# Patient Record
Sex: FEMALE | Race: WHITE | NOT HISPANIC OR LATINO | Employment: FULL TIME | URBAN - NONMETROPOLITAN AREA
[De-identification: names, ages, dates, MRNs, and addresses within clinical notes are randomized per-mention and may not be internally consistent; named-entity substitution may affect disease eponyms.]

---

## 2024-09-21 ENCOUNTER — APPOINTMENT (OUTPATIENT)
Dept: RADIOLOGY | Facility: CLINIC | Age: 54
End: 2024-09-21
Payer: COMMERCIAL

## 2024-09-21 ENCOUNTER — TELEPHONE (OUTPATIENT)
Dept: URGENT CARE | Facility: CLINIC | Age: 54
End: 2024-09-21

## 2024-09-21 ENCOUNTER — OFFICE VISIT (OUTPATIENT)
Dept: URGENT CARE | Facility: CLINIC | Age: 54
End: 2024-09-21
Payer: COMMERCIAL

## 2024-09-21 VITALS
SYSTOLIC BLOOD PRESSURE: 140 MMHG | RESPIRATION RATE: 16 BRPM | WEIGHT: 194.8 LBS | HEIGHT: 67 IN | TEMPERATURE: 97.6 F | DIASTOLIC BLOOD PRESSURE: 92 MMHG | BODY MASS INDEX: 30.57 KG/M2 | OXYGEN SATURATION: 99 % | HEART RATE: 81 BPM

## 2024-09-21 DIAGNOSIS — M54.50 ACUTE MIDLINE LOW BACK PAIN WITHOUT SCIATICA: Primary | ICD-10-CM

## 2024-09-21 DIAGNOSIS — M54.50 ACUTE MIDLINE LOW BACK PAIN WITHOUT SCIATICA: ICD-10-CM

## 2024-09-21 PROCEDURE — 72110 X-RAY EXAM L-2 SPINE 4/>VWS: CPT

## 2024-09-21 PROCEDURE — S9083 URGENT CARE CENTER GLOBAL: HCPCS

## 2024-09-21 PROCEDURE — G0382 LEV 3 HOSP TYPE B ED VISIT: HCPCS

## 2024-09-21 PROCEDURE — 96372 THER/PROPH/DIAG INJ SC/IM: CPT

## 2024-09-21 RX ORDER — KETOROLAC TROMETHAMINE 30 MG/ML
30 INJECTION, SOLUTION INTRAMUSCULAR; INTRAVENOUS ONCE
Status: COMPLETED | OUTPATIENT
Start: 2024-09-21 | End: 2024-09-21

## 2024-09-21 RX ORDER — CYCLOBENZAPRINE HCL 5 MG
5 TABLET ORAL 3 TIMES DAILY PRN
Qty: 12 TABLET | Refills: 0 | Status: SHIPPED | OUTPATIENT
Start: 2024-09-21

## 2024-09-21 RX ORDER — METHYLPREDNISOLONE 4 MG
TABLET, DOSE PACK ORAL
Qty: 21 EACH | Refills: 0 | Status: SHIPPED | OUTPATIENT
Start: 2024-09-21

## 2024-09-21 RX ADMIN — KETOROLAC TROMETHAMINE 30 MG: 30 INJECTION, SOLUTION INTRAMUSCULAR; INTRAVENOUS at 11:02

## 2024-09-21 NOTE — TELEPHONE ENCOUNTER
Attempted to contact patient regarding final XR read. Awaiting call back.    the EKG is unchanged from prior EKG

## 2024-09-21 NOTE — TELEPHONE ENCOUNTER
Patient returned phone call and aware of final XR results: Minimal disc degeneration at L3-L4 and L4-L5 and degenerative arthritis of the L4-L5 facet joints.  Linear lucency projecting over the soft tissues of the upper buttocks region on the lateral view is indeterminate. Possibly just artifact. Soft tissue gas not excluded. Patient without external signs of swelling/skin changes or penetrating injury. No flu-like symptoms. Neuro exam intact. Recommend close follow up with PCP or proceed to ED if symptoms worsen despite medications and PT. Patient verbalized understanding of instructions given and no questions or concerns.

## 2024-09-21 NOTE — PROGRESS NOTES
[facility]        NAME: Marga Neil is a 54 y.o. female  : 1970    MRN: [id number]  DATE: 2024  TIME: 12:47 PM    Assessment and Plan   Acute midline low back pain without sciatica [M54.50]  1. Acute midline low back pain without sciatica  XR spine lumbar minimum 4 views non injury    ketorolac (TORADOL) injection 30 mg    methylPREDNISolone 4 MG tablet therapy pack    cyclobenzaprine (FLEXERIL) 5 mg tablet    Ambulatory Referral to Physical Therapy        Neuro exam intact, no red flag findings. Preliminary XR read negative for acute osseous abnormality, final read pending. Toradol IM. Muscle relaxer and oral steroids, caution advised. Encouraged continued supportive measures.  Heat/Ice. Topical analgesics. Referral placed to PT. Follow up with PCP in 3-5 days or proceed to emergency department for worsening symptoms.  Patient and mother verbalized understanding of instructions given.       Patient Instructions     Patient Instructions   Preliminary XR read negative, final read pending  No NSAIDs for the next 6 hours then OTC Tylenol/Ibuprofen for pain  Take muscle relaxer as prescribed but do not drink alcohol, drive your vehicle, or operate heavy machinery  Take oral steroids as prescribed  Heat/Ice  Topical analgesics  Referral placed to PT   Follow up with PCP in 3-5 days.  Proceed to  ER if symptoms worsen.    If tests have been performed at [facility], our office will contact you with results if changes need to be made to the care plan discussed with you at the visit.  [facility]      Patient Education     Low back pain - Discharge instructions   The Basics   Written by the doctors and editors at [facility]   What are discharge instructions? -- Discharge instructions are information about how to take care of yourself after getting medical care for a health problem.  What is low back pain? -- Low back pain is pain or discomfort in  "the lower part of your back. Many people have low back pain at some point, and it most often gets better on its own. Many different things can cause low back pain. Most of the time, doctors do not know the exact cause.  Back pain can happen if you strain a muscle. This is often what has happened when a person \"throws out\" their back. This refers to pain that starts suddenly after physical activity, like lifting something heavy or bending the back.  Back pain can also happen if you have (figure 1):   Damaged, bulging, or torn discs   Arthritis affecting the joints of the spine   Bony growths on the vertebrae that crowd nearby nerves   A \"compression fracture\" due to osteoporosis (a condition that makes your bones weak)   A vertebra out of place   Narrowing in the spinal canal   A tumor or infection (but this is very rare)  How do I care for myself at home? -- Ask the doctor or nurse what you should do when you go home. Make sure that you understand exactly what you need to do to care for yourself. Ask questions if there is anything you do not understand.  You should also:   Use heat on your back for short periods of time, if it helps with pain. Put a heating pad (on the low setting) on your back for 20 minutes at a time a few times each day. Never go to sleep with heat on your back.   Try to stay as active as you can without causing too much pain, if your doctor or nurse said that it is OK. If your pain is severe, you might need to rest for a day or 2. But it's important to get back to walking and moving as soon as possible. Try to keep doing your normal daily activities. Get up and move around gently during the day as you are able.   Slowly start to increase your activity level as you are able to. If something causes your pain to come back or get worse, stop and go back to doing easier activities that did not hurt.   Avoid sitting or standing in 1 position for a long time. You might want to sleep with a pillow under " "or between your knees if this eases your pain.   Take a medicine like ibuprofen (sample brand names: Advil, Motrin) or naproxen (brand name: Aleve) for pain, if needed. These are nonsteroidal antiinflammatory drugs (\"NSAIDs\"). They might work better than acetaminophen for low back pain.   Talk to your doctor or nurse before trying any of the following. These treatments might help you feel better for a little while:   Spinal manipulation - This is when a chiropractor, physical therapist, or other professional moves or \"adjusts\" the joints of your back.   Acupuncture - This is when someone who knows traditional Chinese medicine inserts tiny needles through your skin to block pain signals.   Massage therapy - The massage therapist manipulates your muscles and soft tissues to decrease muscle tension and increase relaxation.  What follow-up care do I need? -- Your doctor or nurse will tell you if you need to make a follow-up appointment. If so, make sure that you know when and where to go. The doctor might suggest that you see a physical therapist to learn exercises to help with your back pain.  When should I call the doctor? -- Call for emergency help right away (in the [place] and [place], call 9-1-1) if:   You are unable to walk, or cannot control your bowels or bladder.   You develop a fever of 100.4°F (38°C) or higher, chills, or night sweats.  Call your doctor for advice if:   Your legs are numb, weak, or tingly.   Your pain is getting worse, even with medicines and rest.   You develop a new rash.  All topics are updated as new evidence becomes available and our peer review process is complete.  This topic retrieved from [employer] on: May 15, 2024.  Topic 980606 Version 1.0  Release: 32.4.3 - C32.134  © 2024 UpToDate, Inc. and/or its affiliates. All rights reserved.  figure 1: Anatomy of the back     This drawing shows the different parts of the back. Back pain can be caused by problems with the muscles, ligaments, discs, " bones (vertebrae), or nerves.  Graphic 58567 Version 6.0  Consumer Information Use and Disclaimer   Disclaimer: This generalized information is a limited summary of diagnosis, treatment, and/or medication information. It is not meant to be comprehensive and should be used as a tool to help the user understand and/or assess potential diagnostic and treatment options. It does NOT include all information about conditions, treatments, medications, side effects, or risks that may apply to a specific patient. It is not intended to be medical advice or a substitute for the medical advice, diagnosis, or treatment of a health care provider based on the health care provider's examination and assessment of a patient's specific and unique circumstances. Patients must speak with a health care provider for complete information about their health, medical questions, and treatment options, including any risks or benefits regarding use of medications. This information does not endorse any treatments or medications as safe, effective, or approved for treating a specific patient. UpToDate, Inc. and its affiliates disclaim any warranty or liability relating to this information or the use thereof.The use of this information is governed by the Terms of Use, available at https://www.[employer].com/en/know/clinical-effectiveness-terms. 2024© UpToDate, Inc. and its affiliates and/or licensors. All rights reserved.  Copyright   © 2024 UpToDate, Inc. and/or its affiliates. All rights reserved.        Chief Complaint     Chief Complaint   Patient presents with    Back Pain     C/o lower middle back pain, pt states if she walks for an extended amount of time the pain radiates downward and her legs go numb. Onset x6 days ago, no known injury.          History of Present Illness       54-year-old female with no significant past medical history presents with mother with complaints of low back pain x 6 days.  Denies known injury or trauma and  reports symptoms started after awakening.  Reports pain in lumbar region with occasional numbness and tingling down bilateral extremities with ambulation but not at rest.  No fever, chills, or flulike symptoms.  No loss of bowel or bladder incontinence or saddle anesthesia.  No prior history of back pain, injuries, or surgeries.  She has been taking OTC Tylenol/ibuprofen as well as using heat and ice and topical Biofreeze.  Last dose of OTC medications yesterday.    Back Pain  Associated symptoms include numbness. Pertinent negatives include no abdominal pain, chest pain, fever or weakness.       Review of Systems   Review of Systems   Constitutional:  Negative for chills and fever.   HENT:  Negative for congestion, ear discharge, ear pain, rhinorrhea and sore throat.    Eyes:  Negative for discharge.   Respiratory:  Negative for cough, shortness of breath and wheezing.    Cardiovascular:  Negative for chest pain.   Gastrointestinal:  Negative for abdominal pain, diarrhea, nausea and vomiting.   Genitourinary:  Negative for decreased urine volume and difficulty urinating.   Musculoskeletal:  Positive for back pain and gait problem.   Skin:  Negative for rash.   Neurological:  Positive for numbness. Negative for weakness.         Current Medications       Current Outpatient Medications:     cyclobenzaprine (FLEXERIL) 5 mg tablet, Take 1 tablet (5 mg total) by mouth 3 (three) times a day as needed for muscle spasms, Disp: 12 tablet, Rfl: 0    methylPREDNISolone 4 MG tablet therapy pack, Use as directed on package, Disp: 21 each, Rfl: 0    sertraline (ZOLOFT) 50 mg tablet, Take 50 mg by mouth every morning, Disp: , Rfl:   No current facility-administered medications for this visit.    Current Allergies     Allergies as of 09/21/2024 - Reviewed 09/21/2024   Allergen Reaction Noted    Sulfa antibiotics Hives 06/13/2013            The following portions of the patient's history were reviewed and updated as appropriate:  "allergies, current medications, past family history, past medical history, past social history, past surgical history and problem list.     Past Medical History:   Diagnosis Date    Anxiety        Past Surgical History:   Procedure Laterality Date    TUBAL LIGATION         History reviewed. No pertinent family history.      Medications have been verified.        Objective   /92   Pulse 81   Temp 97.6 °F (36.4 °C)   Resp 16   Ht 5' 7\" (1.702 m)   Wt 88.4 kg (194 lb 12.8 oz)   SpO2 99%   BMI 30.51 kg/m²   No LMP recorded.       Physical Exam     Physical Exam  Vitals and nursing note reviewed.   Constitutional:       General: She is not in acute distress.     Appearance: She is not toxic-appearing.   HENT:      Head: Normocephalic.   Eyes:      Conjunctiva/sclera: Conjunctivae normal.   Cardiovascular:      Rate and Rhythm: Normal rate and regular rhythm.      Heart sounds: Normal heart sounds.   Pulmonary:      Effort: Pulmonary effort is normal. No respiratory distress.      Breath sounds: Normal breath sounds. No stridor. No wheezing, rhonchi or rales.   Abdominal:      General: Bowel sounds are normal.      Palpations: Abdomen is soft.      Tenderness: There is no abdominal tenderness.   Musculoskeletal:         General: Tenderness present. No swelling.      Thoracic back: No tenderness or bony tenderness.      Lumbar back: Bony tenderness present. No swelling, signs of trauma or tenderness. Decreased range of motion. Negative right straight leg raise test and negative left straight leg raise test.      Comments: TTP over lumbar spinous processes, no lumbar paraspinal muscle tenderness. No skin changes.    Skin:     General: Skin is warm and dry.   Neurological:      Mental Status: She is alert and oriented to person, place, and time.      Sensory: Sensation is intact.      Motor: Motor function is intact.      Gait: Gait is intact.   Psychiatric:         Mood and Affect: Mood normal.         " Behavior: Behavior normal.

## 2024-09-21 NOTE — PATIENT INSTRUCTIONS
"Preliminary XR read negative, final read pending  No NSAIDs for the next 6 hours then OTC Tylenol/Ibuprofen for pain  Take muscle relaxer as prescribed but do not drink alcohol, drive your vehicle, or operate heavy machinery  Take oral steroids as prescribed  Heat/Ice  Topical analgesics  Referral placed to PT   Follow up with PCP in 3-5 days.  Proceed to  ER if symptoms worsen.    If tests have been performed at [facility], our office will contact you with results if changes need to be made to the care plan discussed with you at the visit.  You can review your full results on [facility].      Patient Education     Low back pain - Discharge instructions   The Basics   Written by the doctors and editors at [facility]   What are discharge instructions? -- Discharge instructions are information about how to take care of yourself after getting medical care for a health problem.  What is low back pain? -- Low back pain is pain or discomfort in the lower part of your back. Many people have low back pain at some point, and it most often gets better on its own. Many different things can cause low back pain. Most of the time, doctors do not know the exact cause.  Back pain can happen if you strain a muscle. This is often what has happened when a person \"throws out\" their back. This refers to pain that starts suddenly after physical activity, like lifting something heavy or bending the back.  Back pain can also happen if you have (figure 1):   Damaged, bulging, or torn discs   Arthritis affecting the joints of the spine   Bony growths on the vertebrae that crowd nearby nerves   A \"compression fracture\" due to osteoporosis (a condition that makes your bones weak)   A vertebra out of place   Narrowing in the spinal canal   A tumor or infection (but this is very rare)  How do I care for myself at home? -- Ask the doctor or nurse what you should do when you go home. Make sure that you understand exactly what you need to do to " "care for yourself. Ask questions if there is anything you do not understand.  You should also:   Use heat on your back for short periods of time, if it helps with pain. Put a heating pad (on the low setting) on your back for 20 minutes at a time a few times each day. Never go to sleep with heat on your back.   Try to stay as active as you can without causing too much pain, if your doctor or nurse said that it is OK. If your pain is severe, you might need to rest for a day or 2. But it's important to get back to walking and moving as soon as possible. Try to keep doing your normal daily activities. Get up and move around gently during the day as you are able.   Slowly start to increase your activity level as you are able to. If something causes your pain to come back or get worse, stop and go back to doing easier activities that did not hurt.   Avoid sitting or standing in 1 position for a long time. You might want to sleep with a pillow under or between your knees if this eases your pain.   Take a medicine like ibuprofen (sample brand names: Advil, Motrin) or naproxen (brand name: Aleve) for pain, if needed. These are nonsteroidal antiinflammatory drugs (\"NSAIDs\"). They might work better than acetaminophen for low back pain.   Talk to your doctor or nurse before trying any of the following. These treatments might help you feel better for a little while:   Spinal manipulation - This is when a chiropractor, physical therapist, or other professional moves or \"adjusts\" the joints of your back.   Acupuncture - This is when someone who knows traditional Chinese medicine inserts tiny needles through your skin to block pain signals.   Massage therapy - The massage therapist manipulates your muscles and soft tissues to decrease muscle tension and increase relaxation.  What follow-up care do I need? -- Your doctor or nurse will tell you if you need to make a follow-up appointment. If so, make sure that you know when and where " to go. The doctor might suggest that you see a physical therapist to learn exercises to help with your back pain.  When should I call the doctor? -- Call for emergency help right away (in the [place] and [place], call 9-1-1) if:   You are unable to walk, or cannot control your bowels or bladder.   You develop a fever of 100.4°F (38°C) or higher, chills, or night sweats.  Call your doctor for advice if:   Your legs are numb, weak, or tingly.   Your pain is getting worse, even with medicines and rest.   You develop a new rash.  All topics are updated as new evidence becomes available and our peer review process is complete.  This topic retrieved from [employer] on: May 15, 2024.  Topic 785796 Version 1.0  Release: 32.4.3 - C32.134  © 2024 UpToDate, Inc. and/or its affiliates. All rights reserved.  figure 1: Anatomy of the back     This drawing shows the different parts of the back. Back pain can be caused by problems with the muscles, ligaments, discs, bones (vertebrae), or nerves.  Graphic 41432 Version 6.0  Consumer Information Use and Disclaimer   Disclaimer: This generalized information is a limited summary of diagnosis, treatment, and/or medication information. It is not meant to be comprehensive and should be used as a tool to help the user understand and/or assess potential diagnostic and treatment options. It does NOT include all information about conditions, treatments, medications, side effects, or risks that may apply to a specific patient. It is not intended to be medical advice or a substitute for the medical advice, diagnosis, or treatment of a health care provider based on the health care provider's examination and assessment of a patient's specific and unique circumstances. Patients must speak with a health care provider for complete information about their health, medical questions, and treatment options, including any risks or benefits regarding use of medications. This information does not endorse any  treatments or medications as safe, effective, or approved for treating a specific patient. UpToDate, Inc. and its affiliates disclaim any warranty or liability relating to this information or the use thereof.The use of this information is governed by the Terms of Use, available at https://www.[employer].com/en/know/clinical-effectiveness-terms. 2024© UpToDate, Inc. and its affiliates and/or licensors. All rights reserved.  Copyright   © 2024 UpToDate, Inc. and/or its affiliates. All rights reserved.

## 2024-10-03 ENCOUNTER — OFFICE VISIT (OUTPATIENT)
Dept: PHYSICAL THERAPY | Facility: CLINIC | Age: 54
End: 2024-10-03
Payer: COMMERCIAL

## 2024-10-03 VITALS — SYSTOLIC BLOOD PRESSURE: 112 MMHG | HEART RATE: 77 BPM | DIASTOLIC BLOOD PRESSURE: 70 MMHG | OXYGEN SATURATION: 97 %

## 2024-10-03 DIAGNOSIS — M54.50 ACUTE MIDLINE LOW BACK PAIN WITHOUT SCIATICA: ICD-10-CM

## 2024-10-03 PROCEDURE — 97161 PT EVAL LOW COMPLEX 20 MIN: CPT | Performed by: PHYSICAL THERAPIST

## 2024-10-03 PROCEDURE — 97110 THERAPEUTIC EXERCISES: CPT | Performed by: PHYSICAL THERAPIST

## 2024-10-03 PROCEDURE — 97530 THERAPEUTIC ACTIVITIES: CPT | Performed by: PHYSICAL THERAPIST

## 2024-10-03 NOTE — PROGRESS NOTES
PT Evaluation     Today's date: 10/3/2024  Patient name: Marga Neil  : 1970  MRN: [id number]  Referring provider: Dilcia Mccrary CRNP  Dx:   Encounter Diagnosis     ICD-10-CM    1. Acute midline low back pain without sciatica  M54.50 Ambulatory Referral to Physical Therapy                     Assessment  Impairments: abnormal or restricted ROM, lacks appropriate home exercise program, pain with function and poor posture   Symptom irritability: low    Assessment details: 55 yo female with resolving lower back pain presenting with impairments of mobility of mid lumbar spine and extension movement dysfunction, non reversal of lumbar lordosis with flexion, dec intervertebral mobility of lumbar spine, dec core strength and lacks HEP.  Pt will benefit from skilled care to achieve goals outlined.   Understanding of Dx/Px/POC: excellent     Prognosis: excellent    Goals  STG   2-3 weeks     Patient to develop  independence with HEP to assist with goal achievements  Patient to develop lumbar AROM flexion 80% of full, extension 65-70% Of full  Pt to develop strength of TA/RA to    4/+5  Decrease lumbar  soft tissue hypertonicity and pain  I with initial body mechanics     LTG  6-8 weeks  Increase lumbar AROM to full flexion and extension for return to full ADLs, home keeping and exercise with no difficulty  Pt to develop proper body mechanics to lift and carry light to medium weights, to return to homekeeping with out difficulty and reduce likelihood of recurrence                                           Plan  Patient would benefit from: skilled physical therapy  Planned modality interventions: cryotherapy and thermotherapy: hydrocollator packs    Planned therapy interventions: abdominal trunk stabilization, manual therapy, neuromuscular re-education, patient/caregiver education, therapeutic exercise, therapeutic activities, home exercise program and body mechanics training    Frequency: 1x week  Duration in  weeks: 6  Plan of Care beginning date: 10/3/2024  Plan of Care expiration date: 2024  Treatment plan discussed with: patient      Subjective Evaluation    History of Present Illness  Mechanism of injury: P tnotes onset of back pain when woke up  after 6 days went to urgent ca  at [facility] and refetted to PT.  Stiffness with out pain with back motions, sitting on sofasofa with sit to stand denies LE weakness , pain , numbness , pins/needels   Patient Goals  Patient goals for therapy: decreased pain  Patient goal: to have HEP to not have this return  Pain  Current pain ratin  At best pain ratin  At worst pain ratin  Aggravating factors: sitting  Progression: improved    Social Support  Steps to enter house: no  Stairs in house: yes   Lives in: multiple-level home  Lives with: significant other    Employment status: working  Hand dominance: right      Diagnostic Tests  X-ray: abnormal (degenerative changes)  Treatments  Current treatment: medication      Objective     Concurrent Complaints  Negative for night pain, disturbed sleep, bladder dysfunction, bowel dysfunction and saddle (S4) numbness    Postural Observations  Seated posture: good  Standing posture: good  Correction of posture: makes symptoms better      Palpation   Left   Tenderness of the erector spinae.     Right   Tenderness of the erector spinae.     Tenderness     Lumbar Spine  Tenderness in the spinous process (L1-2-3).     Left Hip   No tenderness in the ASIS and PSIS.     Right Hip   No tenderness in the ASIS and PSIS.     Neurological Testing     Sensation     Lumbar   Left   Intact: light touch    Right   Intact: light touch    Reflexes   Left   Patellar (L4): normal (2+)  Achilles (S1): normal (2+)    Right   Patellar (L4): normal (2+)  Achilles (S1): normal (2+)    Active Range of Motion   Cervical/Thoracic Spine       Thoracic    Flexion:  WFL  Extension:  WFL  Left lateral flexion:  WFL  Right lateral flexion:  " WFL  Left rotation:  WFL  Right rotation:  WFL    Lumbar   Flexion:  Restriction level: minimal  Extension:  with pain Restriction level: moderate  Left lateral flexion:  WFL  Right lateral flexion:  WFL  Left rotation:  Restriction level: minimal  Right rotation:  Restriction level: minimal    Passive Range of Motion     Lumbar   Extension: with pain    Joint Play     Hypomobile: L2, L3 and L4     Pain: L2, L3 and L4   Mechanical Assessment    Cervical      Thoracic      Lumbar    Standing flexion: repeated movements   Pain intensity: better  Standing extension: repeated movements  Pain location: no change  Pain intensity: worse    Strength/Myotome Testing     Lumbar   Left   Normal strength  Heel walk: normal  Toe walk: normal    Right   Normal strength  Heel walk: normal  Toe walk: normal    Additional Strength Details  TA/RA  4/5    Tests     Lumbar     Left   Negative crossed SLR, passive SLR and quadrant.     Right   Negative crossed SLR, passive SLR and quadrant.     Left Pelvic Girdle/Sacrum   Negative: active SLR test.     Right Pelvic Girdle/Sacrum   Negative: active SLR test.     Left Hip   Negative KEYANNA.     Right Hip   Negative KEYANNA.     Ambulation     Observational Gait   Gait: within functional limits            Precautions: none     Daily Treatment Diary:      Initial Evaluation Date: 10/03/24  Compliance 10/3                     Visit Number 1                    Re-Eval  IE                 MC   Foto Captured Y                           10/3                     Manual                      STM L/S -                     P/A vaughn GI-II -                                           Ther-Ex                      SKC B 10x10\"                     DKC 10x5\"                     LTR 10x5\"                     PPT 10x5\"                                                                                                                                                         Neuro Re-Ed                               "                                                                  Ther-Act              HEP, self mgmts, tx plan Pc initial body mech also                                                Modalities                                                                           Access Code: [email or web address]  URL: [email or web address]  Date: 10/03/2024  Prepared by: Lakeshia Lo    Exercises  - Supine Posterior Pelvic Tilt  - 2 x daily - 7 x weekly - 1-2 sets - 10 reps - 5 hold  - Supine Single Knee to Chest Stretch  - 2 x daily - 7 x weekly - 1-2 sets - 10 reps - 10 hold  - Supine Double Knee to Chest  - 2 x daily - 7 x weekly - 1-2 sets - 10 reps - 10 hold  - Supine Lower Trunk Rotation  - 2 x daily - 7 x weekly - 1-2 sets - 10 reps - 5 hold    Patient Education  - Office Posture  - Lifting Techniques  - Sleep Positions  - Household Activities  - Low Back Pain

## 2024-10-10 ENCOUNTER — OFFICE VISIT (OUTPATIENT)
Dept: PHYSICAL THERAPY | Facility: CLINIC | Age: 54
End: 2024-10-10
Payer: COMMERCIAL

## 2024-10-10 DIAGNOSIS — M54.50 ACUTE MIDLINE LOW BACK PAIN WITHOUT SCIATICA: Primary | ICD-10-CM

## 2024-10-10 PROCEDURE — 97110 THERAPEUTIC EXERCISES: CPT | Performed by: PHYSICAL THERAPIST

## 2024-10-10 PROCEDURE — 97112 NEUROMUSCULAR REEDUCATION: CPT | Performed by: PHYSICAL THERAPIST

## 2024-10-10 NOTE — PROGRESS NOTES
"Daily Note     Today's date: 10/10/2024  Patient name: Marga Neil  : 1970  MRN: [id number]  Referring provider: Dilcia Mccrary CRNP  Dx:   Encounter Diagnosis     ICD-10-CM    1. Acute midline low back pain without sciatica  M54.50                      Subjective: Pt notes she did have pain Saturday after being on feet for 8 hours. She did take aleve and tylenol and by next day pain resolved and just had stiffness, which persists in am. Pt notes with HEP she is able to reduce stiffness. She notes no pain sx today.       Objective: See treatment diary below. Progressed with core stabilization and rom exercises of lumbar spine this date with fatigue and cueing required.  Pt noted no pain sx post tx.       Assessment: Tolerated treatment well but decrease in core strength and endurance evident as proceeded through ex.  Plank tolerated x 10 seconds prior to inability to maintain form. Patient would benefit from continued PT, skilled care to achieve goals outlined.       Plan: Continue per plan of care.      Precautions: none     Daily Treatment Diary:      Initial Evaluation Date: 10/03/24  Compliance 10/3  10/10                   Visit Number 1 2                   Re-Eval  IE                    Foto Captured Y                           10/3  10/10                   Manual                      STM L/S -                     P/A vaughn GI-II -                                           Ther-Ex                      SKC B 10x10\"                     DKC 10x5\"                     LTR 10x5\"                     PPT 10x5\"                     Prone press ups   10x5\"                   DKC sw ball rolls lower abd   20x 3\"                   Piriform stretch  B   3 x 30\"                   St trunk extension rom    10x 5\"                                                               Neuro Re-Ed                      Bird dog with TA   hip ext only  20x ea                     Prone plank on elbows  3 x 10\" cues form        " "   Sw ball pelvic/lumbar rom  20xea clock ccw/cw, lat, a/p           Cat/cow  3\" 20x           Pallof press  Gtb 20x ea           Ther-Act              HEP, self mgmts, tx plan Pc initial body mech also                                                Modalities                                                                           Access Code: [email or web address]  URL: [email or web address]  Date: 10/03/2024  Prepared by: Lakeshia Lo    Exercises  - Supine Posterior Pelvic Tilt  - 2 x daily - 7 x weekly - 1-2 sets - 10 reps - 5 hold  - Supine Single Knee to Chest Stretch  - 2 x daily - 7 x weekly - 1-2 sets - 10 reps - 10 hold  - Supine Double Knee to Chest  - 2 x daily - 7 x weekly - 1-2 sets - 10 reps - 10 hold  - Supine Lower Trunk Rotation  - 2 x daily - 7 x weekly - 1-2 sets - 10 reps - 5 hold    Patient Education  - Office Posture  - Lifting Techniques  - Sleep Positions  - Household Activities  - Low Back Pain         "

## 2024-10-14 ENCOUNTER — OFFICE VISIT (OUTPATIENT)
Dept: PHYSICAL THERAPY | Facility: CLINIC | Age: 54
End: 2024-10-14
Payer: COMMERCIAL

## 2024-10-14 DIAGNOSIS — M54.50 ACUTE MIDLINE LOW BACK PAIN WITHOUT SCIATICA: Primary | ICD-10-CM

## 2024-10-14 PROCEDURE — 97112 NEUROMUSCULAR REEDUCATION: CPT | Performed by: PHYSICAL THERAPIST

## 2024-10-14 NOTE — PROGRESS NOTES
"Daily Note     Today's date: 10/14/2024  Patient name: Marga Neil  : 1970  MRN: [id number]  Referring provider: Dilcia Mccrary CRNP  Dx:   Encounter Diagnosis     ICD-10-CM    1. Acute midline low back pain without sciatica  M54.50                      Subjective: Pt notes her back is feeling good, no pain for the past over week.       Objective: See treatment diary below. Pt demonstrated full active and passive lumbar rom today with out pain sx.       Assessment: Tolerated treatment well. Patient would benefit from continued PT, skilled care, to achieve goals.  HEP progressed to core stabilization. Pt able to complete with min cueing following initial education/instruction.       Plan: Continue per plan of care.      Precautions: none     Daily Treatment Diary:      Initial Evaluation Date: 10/03/24  Compliance 10/3  10/10  10/14                 Visit Number 1 2  3                 Re-Eval  IE                 MC   Foto Captured Y                           10/3  10/10  10/14                 Manual                      STM L/S -                     P/A glides GI-II -                                           Ther-Ex                      SKC B 10x10\"                     DKC 10x5\"                     LTR 10x5\"                     PPT 10x5\"                     Prone press ups   10x5\"  10x5\"                 DKC sw ball rolls lower abd   20x 3\"  20x3\"                 Piriform stretch  B   3 x 30\"  3 x 20\"                 St trunk extension rom    10x 5\"  5 x normal                 superman     10x3\"                                       Neuro Re-Ed                      Bird dog with TA   hip ext only  20x ea  20x ea                   Prone plank on elbows  3 x 10\" cues form 3 x 20\"          Sw ball pelvic/lumbar rom  20xea clock ccw/cw, lat, a/p T lift with TA 10x ea          Cat/cow  3\" 20x -          Pallof press  Gtb 20x ea Osvaldo 10psi 2 x 10, 3\"          Ther-Act              HEP, self mgmts, tx plan Pc " [facility]    pc                                            Modalities                                                                           Access Code: [email or web address]  URL: [email or web address]  Date: 10/03/2024  Prepared by: Lakeshia Lo    Exercises  - Supine Posterior Pelvic Tilt  - 2 x daily - 7 x weekly - 1-2 sets - 10 reps - 5 hold  - Supine Single Knee to Chest Stretch  - 2 x daily - 7 x weekly - 1-2 sets - 10 reps - 10 hold  - Supine Double Knee to Chest  - 2 x daily - 7 x weekly - 1-2 sets - 10 reps - 10 hold  - Supine Lower Trunk Rotation  - 2 x daily - 7 x weekly - 1-2 sets - 10 reps - 5 hold    Patient Education  - Office Posture  - Lifting Techniques  - Sleep Positions  - Household Activities  - Low Back Pain

## 2024-10-29 ENCOUNTER — OFFICE VISIT (OUTPATIENT)
Dept: PHYSICAL THERAPY | Facility: CLINIC | Age: 54
End: 2024-10-29
Payer: COMMERCIAL

## 2024-10-29 DIAGNOSIS — M54.50 ACUTE MIDLINE LOW BACK PAIN WITHOUT SCIATICA: Primary | ICD-10-CM

## 2024-10-29 PROCEDURE — 97530 THERAPEUTIC ACTIVITIES: CPT | Performed by: PHYSICAL THERAPIST

## 2024-10-29 PROCEDURE — 97110 THERAPEUTIC EXERCISES: CPT | Performed by: PHYSICAL THERAPIST

## 2024-10-29 NOTE — PROGRESS NOTES
"Daily Note/Discharge    Today's date: 10/29/2024  Patient name: Marga Neil  : 1970  MRN: [id number]  Referring provider: Dilcia Mccrary CRNP  Dx:   Encounter Diagnosis     ICD-10-CM    1. Acute midline low back pain without sciatica  M54.50           Start Time: 1645  Stop Time: 1715  Total time in clinic (min): 30 minutes    Subjective: Pt returns for care today and wishes to d/c to HEP as she has no longer has any back pain. She notes return to sleep, adl's, work and recreation with out difficulty or pain sx. She notes compliance with HEP  3-4 days per week currently. She is not taking any medication for the back. She notes no change in medical status.       Objective: See treatment diary below. Lumbar AROM full with out sx including extension in standing. Passive trunk extension prone is full. Strength of TA/RA and lumbar extensors 5/5. Pt does note some R shoulder pain sx with plank at 35 seconds. Advised pt to not push through shoulder pain to complete sx. NO tenderness to palpation of lumbar spine, PVMs, gluteals this date.       Assessment: Tolerated treatment well. Patient  at appropriate levels for discharge to [facility] with rehab goals achieved.  Able to demonstrate return of HEP with one modification secondary to shoulder discomfort.       Plan: Discharge to [facility] at this time. Pt in agreement.      Precautions: none     Daily Treatment Diary:      Initial Evaluation Date: 10/03/24  Compliance 10/3  10/10  10/14  10/29               Visit Number 1 2  3  4               Re-Eval  IE                 MC   Foto Captured Y     Y                      10/3  10/10  10/14  10/29               Manual                      STM L/S -                     P/A vaughn GI-II -                                           Ther-Ex                      SKC B 10x10\"                     DKC 10x5\"                     LTR 10x5\"                     PPT 10x5\"                     Prone press ups   10x5\"  10x5\"  10x 5\"             " "  DKC sw ball rolls lower abd   20x 3\"  20x3\"  -               Piriform stretch  B   3 x 30\"  3 x 20\"  3 x 20\"               St trunk extension rom    10x 5\"  5 x normal  10x normal               superman     10x3\"  trunk ext phase III 10x                                     Neuro Re-Ed                      Bird dog with TA   hip ext only  20x ea  20x ea                   Prone plank on elbows  3 x 10\" cues form 3 x 20\" 3 x 30\" mod on knees shldr p!         Sw ball pelvic/lumbar rom  20xea clock ccw/cw, lat, a/p T lift with TA 10x ea          Cat/cow  3\" 20x -          Pallof press  Gtb 20x ea Osvaldo 10psi 2 x 10, 3\"          Ther-Act              HEP, self mgmts, tx plan Pc initial body mech also    pc  P, pt educ to establish PCP - signs or sx to contact physician or urgent care                                          Modalities                          -                                                 Access Code: [email or web address]  URL: [email or web address]  Date: 10/03/2024  Prepared by: Lakeshia Lo    Exercises  - Supine Posterior Pelvic Tilt  - 2 x daily - 7 x weekly - 1-2 sets - 10 reps - 5 hold  - Supine Single Knee to Chest Stretch  - 2 x daily - 7 x weekly - 1-2 sets - 10 reps - 10 hold  - Supine Double Knee to Chest  - 2 x daily - 7 x weekly - 1-2 sets - 10 reps - 10 hold  - Supine Lower Trunk Rotation  - 2 x daily - 7 x weekly - 1-2 sets - 10 reps - 5 hold    Patient Education  - Office Posture  - Lifting Techniques  - Sleep Positions  - Household Activities  - Low Back Pain             "